# Patient Record
Sex: FEMALE | Race: WHITE | NOT HISPANIC OR LATINO | Employment: UNEMPLOYED | URBAN - METROPOLITAN AREA
[De-identification: names, ages, dates, MRNs, and addresses within clinical notes are randomized per-mention and may not be internally consistent; named-entity substitution may affect disease eponyms.]

---

## 2024-03-05 ENCOUNTER — TRANSCRIBE ORDERS (OUTPATIENT)
Dept: MATERNAL FETAL MEDICINE | Facility: HOSPITAL | Age: 24
End: 2024-03-05

## 2024-03-05 ENCOUNTER — MEDICAL CORRESPONDENCE (OUTPATIENT)
Dept: HEALTH INFORMATION MANAGEMENT | Facility: CLINIC | Age: 24
End: 2024-03-05
Payer: COMMERCIAL

## 2024-03-05 ENCOUNTER — TRANSFERRED RECORDS (OUTPATIENT)
Dept: HEALTH INFORMATION MANAGEMENT | Facility: CLINIC | Age: 24
End: 2024-03-05
Payer: COMMERCIAL

## 2024-03-05 DIAGNOSIS — O26.90 PREGNANCY RELATED CONDITION, ANTEPARTUM: Primary | ICD-10-CM

## 2024-03-10 ENCOUNTER — HEALTH MAINTENANCE LETTER (OUTPATIENT)
Age: 24
End: 2024-03-10

## 2024-03-19 ENCOUNTER — PRE VISIT (OUTPATIENT)
Dept: MATERNAL FETAL MEDICINE | Facility: HOSPITAL | Age: 24
End: 2024-03-19
Payer: COMMERCIAL

## 2024-03-21 ENCOUNTER — OFFICE VISIT (OUTPATIENT)
Dept: MATERNAL FETAL MEDICINE | Facility: HOSPITAL | Age: 24
End: 2024-03-21
Attending: STUDENT IN AN ORGANIZED HEALTH CARE EDUCATION/TRAINING PROGRAM
Payer: COMMERCIAL

## 2024-03-21 ENCOUNTER — ANCILLARY PROCEDURE (OUTPATIENT)
Dept: ULTRASOUND IMAGING | Facility: HOSPITAL | Age: 24
End: 2024-03-21
Attending: STUDENT IN AN ORGANIZED HEALTH CARE EDUCATION/TRAINING PROGRAM
Payer: COMMERCIAL

## 2024-03-21 DIAGNOSIS — O26.90 PREGNANCY RELATED CONDITION, ANTEPARTUM: ICD-10-CM

## 2024-03-21 DIAGNOSIS — Z36.89 ENCOUNTER FOR FETAL ANATOMIC SURVEY: Primary | ICD-10-CM

## 2024-03-21 DIAGNOSIS — O35.03X0 CHOROID PLEXUS CYST OF FETUS AFFECTING CARE OF MOTHER, ANTEPARTUM, SINGLE OR UNSPECIFIED FETUS: ICD-10-CM

## 2024-03-21 PROCEDURE — 76811 OB US DETAILED SNGL FETUS: CPT

## 2024-03-21 PROCEDURE — 99203 OFFICE O/P NEW LOW 30 MIN: CPT | Mod: 25 | Performed by: STUDENT IN AN ORGANIZED HEALTH CARE EDUCATION/TRAINING PROGRAM

## 2024-03-21 PROCEDURE — 76811 OB US DETAILED SNGL FETUS: CPT | Mod: 26 | Performed by: STUDENT IN AN ORGANIZED HEALTH CARE EDUCATION/TRAINING PROGRAM

## 2024-03-21 NOTE — PROGRESS NOTES
Please see the full imaging report from the [facility] program under the imaging tab.    Mis Hernandes MD  Maternal Fetal Medicine

## 2024-03-21 NOTE — NURSING NOTE
Natalya Cronin is a  at 18w5d who presents to [facility] for a comprehensive ultrasound. Pt reports positive fetal movement. Pt denies bldg/lof/change in discharge, contractions, headache, vision changes, chest pain/SOB or edema. SBAR given to Dr. AMMON Hernandes, see note in Epic.      Deborah Swift RN

## 2024-08-13 ENCOUNTER — HOSPITAL ENCOUNTER (OUTPATIENT)
Facility: CLINIC | Age: 24
Discharge: HOME | End: 2024-08-13
Attending: ADVANCED PRACTICE MIDWIFE | Admitting: REGISTERED NURSE
Payer: COMMERCIAL

## 2024-08-13 VITALS
OXYGEN SATURATION: 98 % | TEMPERATURE: 98.5 F | RESPIRATION RATE: 16 BRPM | SYSTOLIC BLOOD PRESSURE: 119 MMHG | DIASTOLIC BLOOD PRESSURE: 78 MMHG

## 2024-08-13 PROBLEM — Z36.89 ENCOUNTER FOR TRIAGE IN PREGNANT PATIENT: Status: ACTIVE | Noted: 2024-08-13

## 2024-08-13 RX ORDER — LIDOCAINE 40 MG/G
CREAM TOPICAL
Status: DISCONTINUED | OUTPATIENT
Start: 2024-08-13 | End: 2024-08-13 | Stop reason: HOSPADM

## 2024-08-13 RX ORDER — VITAMIN A, VITAMIN C, VITAMIN D-3, VITAMIN E, VITAMIN B-1, VITAMIN B-2, NIACIN, VITAMIN B-6, CALCIUM, IRON, ZINC, COPPER 4000; 120; 400; 22; 1.84; 3; 20; 10; 1; 12; 200; 27; 25; 2 [IU]/1; MG/1; [IU]/1; MG/1; MG/1; MG/1; MG/1; MG/1; MG/1; UG/1; MG/1; MG/1; MG/1; MG/1
TABLET ORAL DAILY
COMMUNITY

## 2024-08-13 ASSESSMENT — ACTIVITIES OF DAILY LIVING (ADL): ADLS_ACUITY_SCORE: 35

## 2024-08-13 NOTE — PROGRESS NOTES
Data: Patient presented to [facility]: 2024  2:46 PM.  Reason for maternal/fetal assessment is decreased fetal movement. Patient reports she has felt the baby move but it has not been what she is used to. She also has complained of a headache and increased swelling in her hands and face. Patient has not taken any medication for the headaches. Patient also reports some spotting on Saturday. Patient denies uterine contractions, leaking of vaginal fluid/rupture of membranes, vaginal bleeding, abdominal pain, pelvic pressure, nausea, vomiting, visual disturbances, epigastric or RUQ pain, significant edema. Patient reports fetal movement is  decreased per patient report . Patient is a 39w3d .  Prenatal record reviewed. Pregnancy has been uncomplicated.    Vital signs wnl. Support person is present. William, her  is with her.    Action: Verbal consent for EFM. Triage assessment completed.     Response: Patient verbalized agreement with plan. Will contact Chacha Singletary CNM with update and further orders.

## 2024-08-13 NOTE — PROGRESS NOTES
Data: Patient assessed in the [facility] for decreased fetal movement. Cervical exam deferred. Membranes intact. Contractions are present. Contactions are occasional and inconsistent, patient does not feel contraction pain. Uterine assessment is mild by palpation during contractions and soft by palpation at rest. See flowsheets for fetal assessment documentation.     Action: Presumed adequate fetal oxygenation documented. Discharge instructions reviewed. Patient instructed to report change in fetal movement, vaginal leaking of fluid or bleeding, abdominal pain, or any concerns related to the pregnancy to provider/clinic.      Response: Orders to discharge home per Chacha Singletary. LUZ. Patient verbalized understanding of education and agreement with plan. Discharged to home at 1545.

## 2024-08-13 NOTE — DISCHARGE INSTRUCTIONS
Learning About When to Call Your Doctor During Pregnancy (After 20 Weeks)  Overview  It's common to have concerns about what might be a problem when you're pregnant. Most pregnancies don't have any serious problems. But it's still important to know when to call your doctor if you have certain symptoms or signs of labor.  These are general suggestions. Your doctor may give you some more information about when to call.  When to call your doctor (after 20 weeks)  Call 911  anytime you think you may need emergency care. For example, call if:  You have severe vaginal bleeding. This means you are soaking through a pad each hour for 2 or more hours.  You have sudden, severe pain in your belly.  You have chest pain, are short of breath, or cough up blood.  You passed out (lost consciousness).  You have a seizure.  You see or feel the umbilical cord.  You think you are about to deliver your baby and can't make it safely to the hospital or birthing center.  Call your doctor now or seek immediate medical care if:  You have vaginal bleeding.  You have belly pain.  You have a fever.  You are dizzy or lightheaded, or you feel like you may faint.  You have signs of a blood clot in your leg (called a deep vein thrombosis), such as:  Pain in the calf, back of the knee, thigh, or groin.  Swelling in your leg or groin.  A color change on the leg or groin. The skin may be reddish or purplish, depending on your usual skin color.  You have symptoms of preeclampsia, such as:  Sudden swelling of your face, hands, or feet.  New vision problems (such as dimness, blurring, or seeing spots).  A severe headache.  You have a sudden release of fluid from your vagina. (You think your water broke.)  You've been having regular contractions for an hour. This means that you've had at least 6 contractions within 1 hour, even after you change your position and drink fluids.  You notice that your baby has stopped moving or is moving less than  "normal.  You have signs of heart failure, such as:  New or increased shortness of breath.  New or worse swelling in your legs, ankles, or feet.  Sudden weight gain, such as more than 2 to 3 pounds in a day or 5 pounds in a week.  Feeling so tired or weak that you cannot do your usual activities.  You have symptoms of a urinary tract infection. These may include:  Pain or burning when you urinate.  A frequent need to urinate without being able to pass much urine.  Pain in the flank, which is just below the rib cage and above the waist on either side of the back.  Blood in your urine.  Watch closely for changes in your health, and be sure to contact your doctor if:  You have vaginal discharge that smells bad.  You feel sad, anxious, or hopeless for more than a few days.  You have skin changes, such as a rash, itching, or a yellow color to your skin.  You have other concerns about your pregnancy.  If you have labor signs at 37 weeks or more  If you have signs of labor at 37 weeks or more, your doctor may tell you to call when your labor becomes more active. Symptoms of active labor include:  Contractions that are regular.  Contractions that are less than 5 minutes apart.  Contractions that are hard to talk through.  Follow-up care is a key part of your treatment and safety. Be sure to make and go to all appointments, and call your doctor if you are having problems. It's also a good idea to know your test results and keep a list of the medicines you take.  Where can you learn more?  Go to https://www.[employer].net/patiented  Enter N531 in the search box to learn more about \"Learning About When to Call Your Doctor During Pregnancy (After 20 Weeks).\"  Current as of: July 10, 2023  Content Version: 14.1    4845-9116 [email or web address].   Care instructions adapted under license by your healthcare professional. If you have questions about a medical condition or this instruction, always ask your healthcare " professional. [employer] disclaims any warranty or liability for your use of this information.

## 2024-08-14 NOTE — PROGRESS NOTES
"Outpatient/Triage Note:    Patient Name:  Natalya Cronin  :      2000  MRN:      [id number]      Assessment:   @ 39w3d here for evaluation of decreased fetal movement.     Plan:   -NST reactive. Discussed likely baby descending in pelvis given light spotting, irregular contractions and \"crotch lightening\".   - Discharge to home undelivered. Reviewed warning signs including decreased fetal movement, leaking of fluid, vaginal bleeding, or signs of labor. Reviewed how to contact on-call provider. Follow-up in clinic with OB provider as scheduled or sooner as needed. All questions answered. Agrees with plan.     Subjective:  Natalya Cronin is a 24 year old  at 39 weeks, who presented to [facility] for evaluation of DFM. Denies leaking of fluid, bleeding, or changes in fetal movement.     Objective:  Vital signs: /78   Temp 98.5  F (36.9  C) (Oral)   Resp 16   LMP 2023   SpO2 98%   FHR: NST reactive  Uterine contractions: every 5-7 minutes, mild    Physical Exam: A&Ox3  Abdomen: SIUP, abdomen non-tender  SVE: deferred  Legs: no edema, moves freely      Results:  No results found for this or any previous visit (from the past 168 hour(s)).      Provider: Chacha Singletary CNM  "

## 2024-08-15 ENCOUNTER — TELEPHONE (OUTPATIENT)
Dept: OBGYN | Facility: CLINIC | Age: 24
End: 2024-08-15
Payer: COMMERCIAL

## 2024-08-15 NOTE — TELEPHONE ENCOUNTER
Natalya called unit stating she has been having contractions throughout the night and is wondering if it's time to come to the hospital yet. She states she had a membrane sweep yesterday morning at her appt and she lost her mucous plug yesterday evening. Patient states her contractions were consistently 5:1:1 for an hour last night and then she stopped timing them. She was able to sleep with some contractions waking her up during the night, contractions continuing this morning. She is still able to walk and talk through contractions. States she is 39+5, . Patient is going to call clinic number to talk to midwife on call to come up with a plan for when to be evaluated.  RADHA YARBROUGH RN on 8/15/2024 at 6:55 AM

## 2024-08-16 ENCOUNTER — HOSPITAL ENCOUNTER (INPATIENT)
Facility: CLINIC | Age: 24
LOS: 2 days | Discharge: HOME | End: 2024-08-18
Payer: COMMERCIAL

## 2024-08-16 ENCOUNTER — ANESTHESIA (OUTPATIENT)
Dept: OBGYN | Facility: CLINIC | Age: 24
End: 2024-08-16
Payer: COMMERCIAL

## 2024-08-16 ENCOUNTER — ANESTHESIA EVENT (OUTPATIENT)
Dept: OBGYN | Facility: CLINIC | Age: 24
End: 2024-08-16
Payer: COMMERCIAL

## 2024-08-16 ENCOUNTER — HOSPITAL ENCOUNTER (OUTPATIENT)
Facility: CLINIC | Age: 24
Discharge: HOME | End: 2024-08-16
Payer: COMMERCIAL

## 2024-08-16 VITALS
TEMPERATURE: 98.4 F | BODY MASS INDEX: 27.49 KG/M2 | WEIGHT: 161 LBS | DIASTOLIC BLOOD PRESSURE: 82 MMHG | OXYGEN SATURATION: 98 % | HEIGHT: 64 IN | RESPIRATION RATE: 16 BRPM | SYSTOLIC BLOOD PRESSURE: 129 MMHG

## 2024-08-16 PROBLEM — O47.9 UTERINE CONTRACTIONS: Status: ACTIVE | Noted: 2024-08-16

## 2024-08-16 LAB
ABO (EXTERNAL): NORMAL
ABO/RH(D): ABNORMAL
ANTIBODY ID: NORMAL
ANTIBODY SCREEN: POSITIVE
GROUP B STREPTOCOCCUS (EXTERNAL): POSITIVE
HEPATITIS B SURFACE ANTIGEN (EXTERNAL): NONREACTIVE
HGB BLD-MCNC: 12.8 G/DL (ref 11.7–15.7)
HIV1+2 AB SERPL QL IA: NONREACTIVE
RH (EXTERNAL): NEGATIVE
RUBELLA ANTIBODY IGG (EXTERNAL): NORMAL
SPECIMEN EXPIRATION DATE: ABNORMAL
SPECIMEN EXPIRATION DATE: NORMAL
T PALLIDUM AB SER QL: NONREACTIVE
TREPONEMA PALLIDUM ANTIBODY (EXTERNAL): NONREACTIVE

## 2024-08-16 PROCEDURE — 370N000003 HC ANESTHESIA WARD SERVICE: Performed by: ANESTHESIOLOGY

## 2024-08-16 PROCEDURE — 86780 TREPONEMA PALLIDUM: CPT

## 2024-08-16 PROCEDURE — 86870 RBC ANTIBODY IDENTIFICATION: CPT

## 2024-08-16 PROCEDURE — 36415 COLL VENOUS BLD VENIPUNCTURE: CPT

## 2024-08-16 PROCEDURE — 258N000003 HC RX IP 258 OP 636

## 2024-08-16 PROCEDURE — 3E0R3BZ INTRODUCTION OF ANESTHETIC AGENT INTO SPINAL CANAL, PERCUTANEOUS APPROACH: ICD-10-PCS | Performed by: ANESTHESIOLOGY

## 2024-08-16 PROCEDURE — 0UQMXZZ REPAIR VULVA, EXTERNAL APPROACH: ICD-10-PCS | Performed by: ADVANCED PRACTICE MIDWIFE

## 2024-08-16 PROCEDURE — 86900 BLOOD TYPING SEROLOGIC ABO: CPT

## 2024-08-16 PROCEDURE — 722N000001 HC LABOR CARE VAGINAL DELIVERY SINGLE

## 2024-08-16 PROCEDURE — 250N000011 HC RX IP 250 OP 636

## 2024-08-16 PROCEDURE — G0463 HOSPITAL OUTPT CLINIC VISIT: HCPCS

## 2024-08-16 PROCEDURE — 00HU33Z INSERTION OF INFUSION DEVICE INTO SPINAL CANAL, PERCUTANEOUS APPROACH: ICD-10-PCS | Performed by: ANESTHESIOLOGY

## 2024-08-16 PROCEDURE — 250N000009 HC RX 250

## 2024-08-16 PROCEDURE — 250N000011 HC RX IP 250 OP 636: Performed by: ANESTHESIOLOGY

## 2024-08-16 PROCEDURE — 120N000001 HC R&B MED SURG/OB

## 2024-08-16 PROCEDURE — 250N000009 HC RX 250: Performed by: ANESTHESIOLOGY

## 2024-08-16 PROCEDURE — 85018 HEMOGLOBIN: CPT

## 2024-08-16 RX ORDER — LIDOCAINE 40 MG/G
CREAM TOPICAL
Status: DISCONTINUED | OUTPATIENT
Start: 2024-08-16 | End: 2024-08-16 | Stop reason: HOSPADM

## 2024-08-16 RX ORDER — ACETAMINOPHEN 325 MG/1
650 TABLET ORAL EVERY 4 HOURS PRN
Status: DISCONTINUED | OUTPATIENT
Start: 2024-08-16 | End: 2024-08-18 | Stop reason: HOSPADM

## 2024-08-16 RX ORDER — OXYTOCIN 10 [USP'U]/ML
10 INJECTION, SOLUTION INTRAMUSCULAR; INTRAVENOUS
Status: DISCONTINUED | OUTPATIENT
Start: 2024-08-16 | End: 2024-08-18 | Stop reason: HOSPADM

## 2024-08-16 RX ORDER — NALBUPHINE HYDROCHLORIDE 10 MG/ML
2.5-5 INJECTION, SOLUTION INTRAMUSCULAR; INTRAVENOUS; SUBCUTANEOUS EVERY 6 HOURS PRN
Status: DISCONTINUED | OUTPATIENT
Start: 2024-08-16 | End: 2024-08-18 | Stop reason: HOSPADM

## 2024-08-16 RX ORDER — NALOXONE HYDROCHLORIDE 0.4 MG/ML
0.2 INJECTION, SOLUTION INTRAMUSCULAR; INTRAVENOUS; SUBCUTANEOUS
Status: DISCONTINUED | OUTPATIENT
Start: 2024-08-16 | End: 2024-08-16 | Stop reason: HOSPADM

## 2024-08-16 RX ORDER — OXYTOCIN 10 [USP'U]/ML
10 INJECTION, SOLUTION INTRAMUSCULAR; INTRAVENOUS
Status: DISCONTINUED | OUTPATIENT
Start: 2024-08-16 | End: 2024-08-16 | Stop reason: HOSPADM

## 2024-08-16 RX ORDER — OXYTOCIN/0.9 % SODIUM CHLORIDE 30/500 ML
340 PLASTIC BAG, INJECTION (ML) INTRAVENOUS CONTINUOUS PRN
Status: DISCONTINUED | OUTPATIENT
Start: 2024-08-16 | End: 2024-08-18 | Stop reason: HOSPADM

## 2024-08-16 RX ORDER — KETOROLAC TROMETHAMINE 30 MG/ML
30 INJECTION, SOLUTION INTRAMUSCULAR; INTRAVENOUS
Status: COMPLETED | OUTPATIENT
Start: 2024-08-16 | End: 2024-08-17

## 2024-08-16 RX ORDER — BISACODYL 10 MG
10 SUPPOSITORY, RECTAL RECTAL DAILY PRN
Status: DISCONTINUED | OUTPATIENT
Start: 2024-08-16 | End: 2024-08-18 | Stop reason: HOSPADM

## 2024-08-16 RX ORDER — LOPERAMIDE HCL 2 MG
4 CAPSULE ORAL
Status: DISCONTINUED | OUTPATIENT
Start: 2024-08-16 | End: 2024-08-18 | Stop reason: HOSPADM

## 2024-08-16 RX ORDER — TRANEXAMIC ACID 10 MG/ML
1 INJECTION, SOLUTION INTRAVENOUS EVERY 30 MIN PRN
Status: DISCONTINUED | OUTPATIENT
Start: 2024-08-16 | End: 2024-08-16 | Stop reason: HOSPADM

## 2024-08-16 RX ORDER — OXYCODONE HYDROCHLORIDE 5 MG/1
5 TABLET ORAL EVERY 4 HOURS PRN
Status: DISCONTINUED | OUTPATIENT
Start: 2024-08-16 | End: 2024-08-18 | Stop reason: HOSPADM

## 2024-08-16 RX ORDER — FENTANYL CITRATE-0.9 % NACL/PF 10 MCG/ML
100 PLASTIC BAG, INJECTION (ML) INTRAVENOUS EVERY 5 MIN PRN
Status: DISCONTINUED | OUTPATIENT
Start: 2024-08-16 | End: 2024-08-16 | Stop reason: HOSPADM

## 2024-08-16 RX ORDER — NALOXONE HYDROCHLORIDE 0.4 MG/ML
0.4 INJECTION, SOLUTION INTRAMUSCULAR; INTRAVENOUS; SUBCUTANEOUS
Status: DISCONTINUED | OUTPATIENT
Start: 2024-08-16 | End: 2024-08-18 | Stop reason: HOSPADM

## 2024-08-16 RX ORDER — NALOXONE HYDROCHLORIDE 0.4 MG/ML
0.2 INJECTION, SOLUTION INTRAMUSCULAR; INTRAVENOUS; SUBCUTANEOUS
Status: DISCONTINUED | OUTPATIENT
Start: 2024-08-16 | End: 2024-08-18 | Stop reason: HOSPADM

## 2024-08-16 RX ORDER — ONDANSETRON 2 MG/ML
4 INJECTION INTRAMUSCULAR; INTRAVENOUS EVERY 6 HOURS PRN
Status: DISCONTINUED | OUTPATIENT
Start: 2024-08-16 | End: 2024-08-16 | Stop reason: HOSPADM

## 2024-08-16 RX ORDER — CITRIC ACID/SODIUM CITRATE 334-500MG
30 SOLUTION, ORAL ORAL
Status: DISCONTINUED | OUTPATIENT
Start: 2024-08-16 | End: 2024-08-16 | Stop reason: HOSPADM

## 2024-08-16 RX ORDER — ONDANSETRON 4 MG/1
4 TABLET, ORALLY DISINTEGRATING ORAL EVERY 6 HOURS PRN
Status: DISCONTINUED | OUTPATIENT
Start: 2024-08-16 | End: 2024-08-16 | Stop reason: HOSPADM

## 2024-08-16 RX ORDER — ACETAMINOPHEN 325 MG/1
650 TABLET ORAL EVERY 4 HOURS PRN
Status: DISCONTINUED | OUTPATIENT
Start: 2024-08-16 | End: 2024-08-16 | Stop reason: HOSPADM

## 2024-08-16 RX ORDER — NALOXONE HYDROCHLORIDE 0.4 MG/ML
0.4 INJECTION, SOLUTION INTRAMUSCULAR; INTRAVENOUS; SUBCUTANEOUS
Status: DISCONTINUED | OUTPATIENT
Start: 2024-08-16 | End: 2024-08-16 | Stop reason: HOSPADM

## 2024-08-16 RX ORDER — HYDROCORTISONE 25 MG/G
CREAM TOPICAL 3 TIMES DAILY PRN
Status: DISCONTINUED | OUTPATIENT
Start: 2024-08-16 | End: 2024-08-18 | Stop reason: HOSPADM

## 2024-08-16 RX ORDER — OXYTOCIN/0.9 % SODIUM CHLORIDE 30/500 ML
100-340 PLASTIC BAG, INJECTION (ML) INTRAVENOUS CONTINUOUS PRN
Status: DISCONTINUED | OUTPATIENT
Start: 2024-08-16 | End: 2024-08-18 | Stop reason: HOSPADM

## 2024-08-16 RX ORDER — MISOPROSTOL 200 UG/1
400 TABLET ORAL
Status: DISCONTINUED | OUTPATIENT
Start: 2024-08-16 | End: 2024-08-16 | Stop reason: HOSPADM

## 2024-08-16 RX ORDER — SODIUM CHLORIDE, SODIUM LACTATE, POTASSIUM CHLORIDE, CALCIUM CHLORIDE 600; 310; 30; 20 MG/100ML; MG/100ML; MG/100ML; MG/100ML
INJECTION, SOLUTION INTRAVENOUS CONTINUOUS
Status: DISCONTINUED | OUTPATIENT
Start: 2024-08-16 | End: 2024-08-18 | Stop reason: HOSPADM

## 2024-08-16 RX ORDER — IBUPROFEN 800 MG/1
800 TABLET, FILM COATED ORAL
Status: COMPLETED | OUTPATIENT
Start: 2024-08-16 | End: 2024-08-17

## 2024-08-16 RX ORDER — MISOPROSTOL 200 UG/1
400 TABLET ORAL
Status: DISCONTINUED | OUTPATIENT
Start: 2024-08-16 | End: 2024-08-18 | Stop reason: HOSPADM

## 2024-08-16 RX ORDER — CARBOPROST TROMETHAMINE 250 UG/ML
250 INJECTION, SOLUTION INTRAMUSCULAR
Status: DISCONTINUED | OUTPATIENT
Start: 2024-08-16 | End: 2024-08-16 | Stop reason: HOSPADM

## 2024-08-16 RX ORDER — OXYTOCIN/0.9 % SODIUM CHLORIDE 30/500 ML
340 PLASTIC BAG, INJECTION (ML) INTRAVENOUS CONTINUOUS PRN
Status: DISCONTINUED | OUTPATIENT
Start: 2024-08-16 | End: 2024-08-16 | Stop reason: HOSPADM

## 2024-08-16 RX ORDER — LIDOCAINE HYDROCHLORIDE AND EPINEPHRINE 15; 5 MG/ML; UG/ML
INJECTION, SOLUTION EPIDURAL PRN
Status: DISCONTINUED | OUTPATIENT
Start: 2024-08-16 | End: 2024-08-16

## 2024-08-16 RX ORDER — LOPERAMIDE HCL 2 MG
2 CAPSULE ORAL
Status: DISCONTINUED | OUTPATIENT
Start: 2024-08-16 | End: 2024-08-18 | Stop reason: HOSPADM

## 2024-08-16 RX ORDER — CARBOPROST TROMETHAMINE 250 UG/ML
250 INJECTION, SOLUTION INTRAMUSCULAR
Status: DISCONTINUED | OUTPATIENT
Start: 2024-08-16 | End: 2024-08-18 | Stop reason: HOSPADM

## 2024-08-16 RX ORDER — METHYLERGONOVINE MALEATE 0.2 MG/ML
200 INJECTION INTRAVENOUS
Status: DISCONTINUED | OUTPATIENT
Start: 2024-08-16 | End: 2024-08-16 | Stop reason: HOSPADM

## 2024-08-16 RX ORDER — DOCUSATE SODIUM 100 MG/1
100 CAPSULE, LIQUID FILLED ORAL DAILY
Status: DISCONTINUED | OUTPATIENT
Start: 2024-08-17 | End: 2024-08-18 | Stop reason: HOSPADM

## 2024-08-16 RX ORDER — FENTANYL/ROPIVACAINE/NS/PF 2MCG/ML-.1
PLASTIC BAG, INJECTION (ML) EPIDURAL
Status: DISCONTINUED | OUTPATIENT
Start: 2024-08-16 | End: 2024-08-16 | Stop reason: HOSPADM

## 2024-08-16 RX ORDER — METOCLOPRAMIDE HYDROCHLORIDE 5 MG/ML
10 INJECTION INTRAMUSCULAR; INTRAVENOUS EVERY 6 HOURS PRN
Status: DISCONTINUED | OUTPATIENT
Start: 2024-08-16 | End: 2024-08-16 | Stop reason: HOSPADM

## 2024-08-16 RX ORDER — MISOPROSTOL 200 UG/1
800 TABLET ORAL
Status: DISCONTINUED | OUTPATIENT
Start: 2024-08-16 | End: 2024-08-18 | Stop reason: HOSPADM

## 2024-08-16 RX ORDER — IBUPROFEN 800 MG/1
800 TABLET, FILM COATED ORAL EVERY 6 HOURS PRN
Status: DISCONTINUED | OUTPATIENT
Start: 2024-08-16 | End: 2024-08-18 | Stop reason: HOSPADM

## 2024-08-16 RX ORDER — TRANEXAMIC ACID 10 MG/ML
1 INJECTION, SOLUTION INTRAVENOUS EVERY 30 MIN PRN
Status: DISCONTINUED | OUTPATIENT
Start: 2024-08-16 | End: 2024-08-18 | Stop reason: HOSPADM

## 2024-08-16 RX ORDER — MISOPROSTOL 200 UG/1
800 TABLET ORAL
Status: DISCONTINUED | OUTPATIENT
Start: 2024-08-16 | End: 2024-08-16 | Stop reason: HOSPADM

## 2024-08-16 RX ORDER — PENICILLIN G 3000000 [IU]/50ML
3 INJECTION, SOLUTION INTRAVENOUS EVERY 4 HOURS
Status: DISCONTINUED | OUTPATIENT
Start: 2024-08-16 | End: 2024-08-16 | Stop reason: HOSPADM

## 2024-08-16 RX ORDER — METOCLOPRAMIDE 10 MG/1
10 TABLET ORAL EVERY 6 HOURS PRN
Status: DISCONTINUED | OUTPATIENT
Start: 2024-08-16 | End: 2024-08-16 | Stop reason: HOSPADM

## 2024-08-16 RX ORDER — LOPERAMIDE HCL 2 MG
2 CAPSULE ORAL
Status: DISCONTINUED | OUTPATIENT
Start: 2024-08-16 | End: 2024-08-16 | Stop reason: HOSPADM

## 2024-08-16 RX ORDER — MODIFIED LANOLIN
OINTMENT (GRAM) TOPICAL
Status: DISCONTINUED | OUTPATIENT
Start: 2024-08-16 | End: 2024-08-18 | Stop reason: HOSPADM

## 2024-08-16 RX ORDER — BUPIVACAINE HYDROCHLORIDE 2.5 MG/ML
INJECTION, SOLUTION EPIDURAL; INFILTRATION; INTRACAUDAL
Status: COMPLETED | OUTPATIENT
Start: 2024-08-16 | End: 2024-08-16

## 2024-08-16 RX ORDER — FENTANYL CITRATE 50 UG/ML
100 INJECTION, SOLUTION INTRAMUSCULAR; INTRAVENOUS
Status: DISCONTINUED | OUTPATIENT
Start: 2024-08-16 | End: 2024-08-16 | Stop reason: HOSPADM

## 2024-08-16 RX ORDER — METHYLERGONOVINE MALEATE 0.2 MG/ML
200 INJECTION INTRAVENOUS
Status: DISCONTINUED | OUTPATIENT
Start: 2024-08-16 | End: 2024-08-18 | Stop reason: HOSPADM

## 2024-08-16 RX ORDER — PROCHLORPERAZINE 25 MG
25 SUPPOSITORY, RECTAL RECTAL EVERY 12 HOURS PRN
Status: DISCONTINUED | OUTPATIENT
Start: 2024-08-16 | End: 2024-08-16 | Stop reason: HOSPADM

## 2024-08-16 RX ORDER — PROCHLORPERAZINE MALEATE 10 MG
10 TABLET ORAL EVERY 6 HOURS PRN
Status: DISCONTINUED | OUTPATIENT
Start: 2024-08-16 | End: 2024-08-16 | Stop reason: HOSPADM

## 2024-08-16 RX ORDER — PENICILLIN G POTASSIUM 5000000 [IU]/1
5 INJECTION, POWDER, FOR SOLUTION INTRAMUSCULAR; INTRAVENOUS ONCE
Status: COMPLETED | OUTPATIENT
Start: 2024-08-16 | End: 2024-08-16

## 2024-08-16 RX ORDER — LOPERAMIDE HCL 2 MG
4 CAPSULE ORAL
Status: DISCONTINUED | OUTPATIENT
Start: 2024-08-16 | End: 2024-08-16 | Stop reason: HOSPADM

## 2024-08-16 RX ADMIN — LIDOCAINE HYDROCHLORIDE,EPINEPHRINE BITARTRATE 2 ML: 15; .005 INJECTION, SOLUTION EPIDURAL; INFILTRATION; INTRACAUDAL; PERINEURAL at 18:32

## 2024-08-16 RX ADMIN — Medication 340 ML/HR: at 23:19

## 2024-08-16 RX ADMIN — SODIUM CHLORIDE, POTASSIUM CHLORIDE, SODIUM LACTATE AND CALCIUM CHLORIDE: 600; 310; 30; 20 INJECTION, SOLUTION INTRAVENOUS at 15:43

## 2024-08-16 RX ADMIN — SODIUM CHLORIDE, POTASSIUM CHLORIDE, SODIUM LACTATE AND CALCIUM CHLORIDE: 600; 310; 30; 20 INJECTION, SOLUTION INTRAVENOUS at 18:24

## 2024-08-16 RX ADMIN — Medication: at 18:35

## 2024-08-16 RX ADMIN — BUPIVACAINE HYDROCHLORIDE 5 ML: 2.5 INJECTION, SOLUTION EPIDURAL; INFILTRATION; INTRACAUDAL at 18:35

## 2024-08-16 RX ADMIN — PENICILLIN G 3 MILLION UNITS: 3000000 INJECTION, SOLUTION INTRAVENOUS at 19:49

## 2024-08-16 RX ADMIN — LIDOCAINE HYDROCHLORIDE,EPINEPHRINE BITARTRATE 3 ML: 15; .005 INJECTION, SOLUTION EPIDURAL; INFILTRATION; INTRACAUDAL; PERINEURAL at 18:29

## 2024-08-16 RX ADMIN — PENICILLIN G POTASSIUM 5 MILLION UNITS: 5000000 POWDER, FOR SOLUTION INTRAMUSCULAR; INTRAPLEURAL; INTRATHECAL; INTRAVENOUS at 15:43

## 2024-08-16 RX ADMIN — ONDANSETRON 4 MG: 2 INJECTION INTRAMUSCULAR; INTRAVENOUS at 18:09

## 2024-08-16 ASSESSMENT — ACTIVITIES OF DAILY LIVING (ADL)
ADLS_ACUITY_SCORE: 18
ADLS_ACUITY_SCORE: 35
ADLS_ACUITY_SCORE: 18

## 2024-08-16 NOTE — PROGRESS NOTES
Natalya Cronin, , 39w6d, arrived to triage, accompanied by spouse William. Patient here with c/o contractions/bleeding. Patient under care of [facility], did consult provider prior to arriving to triage.     Patient reports contractions. Patient rates pain at 6/10. Patient denies SROM. Patient reports vaginal bleeding. Patient denies s/s of preeclampsia. Fetal movement Present. Patient denies issues this pregnancy.    Patient oriented to room, call light in reach. EFM and toco applied. Triage orders placed.  Provider updated, Didi Phillips CNM.    Viki Youssef RN

## 2024-08-16 NOTE — ANESTHESIA PREPROCEDURE EVALUATION
"Anesthesia Pre-Procedure Evaluation    Patient: Natalya Cronin   MRN: [id number] : 2000        Procedure : * No procedures listed *          No past medical history on file.   Past Surgical History:   Procedure Laterality Date     wisdom teeth  2019      No Known Allergies   Social History     Tobacco Use     Smoking status: Never     Smokeless tobacco: Never   Substance Use Topics     Alcohol use: Not Currently      Wt Readings from Last 1 Encounters:   24 73 kg (161 lb)        Anesthesia Evaluation            ROS/MED HX  ENT/Pulmonary:       Neurologic:  - neg neurologic ROS     Cardiovascular:  - neg cardiovascular ROS     METS/Exercise Tolerance:     Hematologic:  - neg hematologic  ROS     Musculoskeletal:  - neg musculoskeletal ROS     GI/Hepatic:  - neg GI/hepatic ROS     Renal/Genitourinary:  - neg Renal ROS     Endo:  - neg endo ROS     Psychiatric/Substance Use:  - neg psychiatric ROS     Infectious Disease:  - neg infectious disease ROS     Malignancy:       Other:      (+) Possibly pregnant, , ,       Physical Exam    Airway        Mallampati: II   TM distance: > 3 FB   Neck ROM: full   Mouth opening: > 3 cm    Respiratory Devices and Support         Dental       (+) Minor Abnormalities - some fillings, tiny chips      Cardiovascular   cardiovascular exam normal       Rhythm and rate: regular and normal     Pulmonary   pulmonary exam normal        breath sounds clear to auscultation       OUTSIDE LABS:  CBC:   Lab Results   Component Value Date    HGB 12.8 2024     BMP: No results found for: \"NA\", \"POTASSIUM\", \"CHLORIDE\", \"CO2\", \"BUN\", \"CR\", \"GLC\"  COAGS: No results found for: \"PTT\", \"INR\", \"FIBR\"  POC: No results found for: \"BGM\", \"HCG\", \"HCGS\"  HEPATIC: No results found for: \"ALBUMIN\", \"PROTTOTAL\", \"ALT\", \"AST\", \"GGT\", \"ALKPHOS\", \"BILITOTAL\", \"BILIDIRECT\", \"MATTHEW\"  OTHER: No results found for: \"PH\", \"LACT\", \"A1C\", \"LANDEN\", \"PHOS\", \"MAG\", \"LIPASE\", \"AMYLASE\", \"TSH\", \"T4\", \"T3\", " "\"CRP\", \"SED\"    Anesthesia Plan    ASA Status:  2       Anesthesia Type: Epidural.              Consents    Anesthesia Plan(s) and associated risks, benefits, and realistic alternatives discussed. Questions answered and patient/representative(s) expressed understanding.     - Discussed: Risks, Benefits and Alternatives for the PROCEDURE were discussed     - Discussed with:  Patient, Spouse            Postoperative Care            Comments:             Devin Patiño MD    I have reviewed the pertinent notes and labs in the chart from the past 30 days and (re)examined the patient.  Any updates or changes from those notes are reflected in this note.                  "

## 2024-08-16 NOTE — ANESTHESIA PROCEDURE NOTES
"Epidural catheter Procedure Note    Pre-Procedure   Staff -        Anesthesiologist:  Devin Patiño MD       Performed By: anesthesiologist       Location: OB       Procedure Start/Stop Times: 8/16/2024 6:22 PM and 8/16/2024 6:39 PM       Pre-Anesthestic Checklist: patient identified, risks and benefits discussed, informed consent, monitors and equipment checked and pre-op evaluation  Timeout:       Correct Patient: Yes        Correct Procedure: Yes        Correct Site: Yes        Correct Position: Yes   Procedure Documentation  Procedure: epidural catheter       Diagnosis: labor pain       Patient Position: sitting       Patient Prep/Sterile Barriers: sterile gloves, mask, patient draped       Skin prep: Chloraprep       Local skin infiltrated with 2 mL of 1% lidocaine.        Insertion Site: L3-4. (midline approach).       Technique: LORT saline        Needle Type: [employer]       Needle Gauge: 18.        Catheter: 20 G.          Catheter threaded easily.         6 cm epidural space.           # of attempts: 1 and  # of redirects:  0    Assessment/Narrative         Paresthesias: No.       Test dose of 3 mL lidocaine 1.5% w/ 1:200,000 epinephrine at.        .       Insertion/Infusion Method: LORT saline       Aspiration negative for Heme or CSF via Epidural Catheter.    Medication(s) Administered   0.25% Bupivacaine PF (Epidural) - EPIDURAL   5 mL - 8/16/2024 6:35:00 PM  Medication Administration Time: 8/16/2024 6:22 PM      FOR [facility] ([facility] ONLY:   Pain Team Contact information: please page the Pain Team Via [employer]. Search \"Pain\". During daytime hours, please page the attending first. At night please page the resident first.      "

## 2024-08-17 LAB
ABO/RH(D): NORMAL
FETAL BLEED SCREEN: NORMAL
HGB BLD-MCNC: 11 G/DL (ref 11.7–15.7)
SPECIMEN EXPIRATION DATE: NORMAL

## 2024-08-17 PROCEDURE — 250N000013 HC RX MED GY IP 250 OP 250 PS 637: Performed by: ADVANCED PRACTICE MIDWIFE

## 2024-08-17 PROCEDURE — 250N000011 HC RX IP 250 OP 636: Performed by: ADVANCED PRACTICE MIDWIFE

## 2024-08-17 PROCEDURE — 36415 COLL VENOUS BLD VENIPUNCTURE: CPT | Performed by: ADVANCED PRACTICE MIDWIFE

## 2024-08-17 PROCEDURE — 250N000011 HC RX IP 250 OP 636

## 2024-08-17 PROCEDURE — 36415 COLL VENOUS BLD VENIPUNCTURE: CPT

## 2024-08-17 PROCEDURE — 85018 HEMOGLOBIN: CPT | Performed by: ADVANCED PRACTICE MIDWIFE

## 2024-08-17 PROCEDURE — 120N000001 HC R&B MED SURG/OB

## 2024-08-17 PROCEDURE — 85461 HEMOGLOBIN FETAL: CPT

## 2024-08-17 RX ADMIN — IBUPROFEN 800 MG: 800 TABLET, FILM COATED ORAL at 12:38

## 2024-08-17 RX ADMIN — KETOROLAC TROMETHAMINE 30 MG: 30 INJECTION, SOLUTION INTRAMUSCULAR at 00:37

## 2024-08-17 RX ADMIN — IBUPROFEN 800 MG: 800 TABLET, FILM COATED ORAL at 06:33

## 2024-08-17 RX ADMIN — Medication: at 00:37

## 2024-08-17 RX ADMIN — ACETAMINOPHEN 650 MG: 325 TABLET ORAL at 23:53

## 2024-08-17 RX ADMIN — HUMAN RHO(D) IMMUNE GLOBULIN 300 MCG: 1500 SOLUTION INTRAMUSCULAR; INTRAVENOUS at 03:14

## 2024-08-17 RX ADMIN — IBUPROFEN 800 MG: 800 TABLET, FILM COATED ORAL at 21:23

## 2024-08-17 RX ADMIN — ACETAMINOPHEN 650 MG: 325 TABLET ORAL at 17:11

## 2024-08-17 RX ADMIN — DOCUSATE SODIUM 100 MG: 100 CAPSULE, LIQUID FILLED ORAL at 08:25

## 2024-08-17 RX ADMIN — BENZOCAINE AND LEVOMENTHOL: 200; 5 SPRAY TOPICAL at 00:37

## 2024-08-17 RX ADMIN — ACETAMINOPHEN 650 MG: 325 TABLET ORAL at 04:20

## 2024-08-17 ASSESSMENT — ACTIVITIES OF DAILY LIVING (ADL)
ADLS_ACUITY_SCORE: 18

## 2024-08-17 NOTE — H&P
HISTORY AND PHYSICAL UPDATE ADMISSION EXAM    Name: Natalya Cronin  YOB: 2000  Medical Record Number: [id number]    History of Present Illness: Natalya Cronin is a 24 year old female who is 39w6d pregnant and being admitted for labor management. She was 4.5 cm dilated on admission. Natalya received early and consistent prenatal care.     Last growth US 24 37 wks: EFW 23%  A negative  GBS positive  NIPS low-risk, XX  Declined Tdap       Estimated Date of Delivery: Aug 17, 2024    EGA 39w6d    OB History    Para Term  AB Living   1 0 0 0 0 0   SAB IAB Ectopic Multiple Live Births   0 0 0 0 0      # Outcome Date GA Lbr Hipolito/2nd Weight Sex Type Anes PTL Lv   1 Current                 Lab Results   Component Value Date    AS Positive (A) 2024    HGB 12.8 2024       Prenatal Complications:   Elevated HCG at OB1 (225,000), declined amnio or CVS. NIPS low-risk and normal NT US  Anxiety- stopped sertraline at 32 wks, resume PP  Rh negative, given 23 d/t SEGUN, and 24 at 28 wks  Varicella non-immune  GBS positive    Exam:      BP (!) 112/93   Pulse 109   Temp 98.5  F (36.9  C) (Oral)   Resp 19   LMP 2023   SpO2 97%     Fetal heart Rate Category 1 with accelerations on admission  Contractions every 2-5 on admission    HEENT grossly normal  Neck: no lymphadenopathy or thryoidomegaly  Lungs respirations regular and unlabored  ABD gravid, non-tender  EXT:  moves freely  Vaginal exam 4.5 per RN  Membranes: intact    Assessment:   labor management   antibiotic therapy for GBS+    Plan: Admit - see IP orders  Pain medication as desired  Prophylactic antibiotic for + GBS status  Anticipate     Dr. Christian remains available for consultation and collaboration as needed.    Prenatal record reviewed.    CHARU Johnson CNM      2024   10:04 PM

## 2024-08-17 NOTE — ANESTHESIA POSTPROCEDURE EVALUATION
Patient: Natalya Cronin    Procedure: * No procedures listed *       Anesthesia Type:  Epidural    Note:     Postop Pain Control: Uneventful            Sign Out: Well controlled pain   PONV: No   Neuro/Psych: Uneventful            Sign Out: Acceptable/Baseline neuro status   Airway/Respiratory: Uneventful            Sign Out: Acceptable/Baseline resp. status   CV/Hemodynamics: Uneventful            Sign Out: Acceptable CV status; No obvious hypovolemia; No obvious fluid overload   Other NRE: NONE   DID A NON-ROUTINE EVENT OCCUR?            Last vitals:  Vitals:    08/17/24 0138 08/17/24 0530 08/17/24 0826   BP: 115/64 109/66 125/79   Pulse:  88 93   Resp:  16 16   Temp:  36.8  C (98.2  F) 36.7  C (98  F)   SpO2:   98%       Electronically Signed By: SHANA VILLAFANA MD  August 17, 2024  12:31 PM

## 2024-08-17 NOTE — LACTATION NOTE
Assisted Natalya and Dutch into football hold. Natalya states feeding has been going well, but Dutch will suck for a bit, then fall off the breast. This RN discussed supporting infant head and shoulders and grounding infant. Natalya is able to feel the difference when Dutch has a deep latch vs shallow latch. Discussed watching videos to show obtaining a deep latch as well as hand expression. Showed Natalya c-cup vs ucup and reiterated multiple times to bring infant to the breast, not the breast to the infant.     Dutch had multiple latches, would have multiple sucks, then push herself off the breast. This RN assessed infant suck, she does pull her tongue back but also presses her tongue against this RN finger. This RN encouraged Natalya to continue BF attempts and if she has concerns for infant intake there are options for supplementation if she would like.

## 2024-08-17 NOTE — L&D DELIVERY NOTE
Name: Natalya Cronin  : 2000  MRN: [id number]    PRE DELIVERY DIAGNOSIS  24 year old  at 39w6d  Elevated HCG at OB1 (225,000), declined amnio or CVS. NIPS low-risk and normal NT US  Anxiety- stopped sertraline at 32 wks, may resume PP  Rh negative, given 23 d/t SEGUN, and 24 at 28 wks  Varicella non-immune  GBS positive    POST DELIVERY DIAGNOSIS  1) 24 year old  39w6d  2) Delivery of a viable female infant weighing *pending* via  with APGARs of 8 and 9    YOB: 2026     Birth Time: 11:14 PM     Augmentation No              Indication: n/a  Induction No                      Indication: n/a    Monitors: External     GBS: Positive- adequate treatment, 2 doses PCN    ROM: SROM  Fluid Type: Clear    Labor Analgesia/Anesthesia:Epidural    Cord pH obtained: No   Placenta Date/Time: 2024 11:23 PM   Placenta submitted to Pathology: No    Description of procedure:   24 year old  with PNC w/ MWC and pregnancy complicated by *see above* presented to L&D with labor contractions.  She progressed to complete with SROM. Her hospital course was uncomplicated. Her cervix was 4.5cm  on admission at 1230. She received an epidural at 1830, after which she was 7cm.  SROM occurred at , fluid noted to be clear, cervix 9.5 cm. She was completely dilated at 2235.  Natalya pushed with effective efforts over the course of only 35 min and brought the baby to a slow controlled crown. The fetal head delivered in RC position followed by the anterior shoulder, which delivered easily with gentle downward traction paired with maternal efforts. Natalya Cronin and her  William welcomed their daughter, Dutch.     Shoulder Dystocia No  Operative Vaginal Delivery No    Infant spontaneously delivered over an intact perineum and right labial tear.  It was repaired in the normal fashion using epidural anesthesia using 4-0 vicryl.  Infant delivered in RC position.  Nuchal cord Yes     Delivered through    After a 5 min delay per parent's request, the umbilical cord was clamped x 2 and cut by FOB.  Placenta spontaneously delivered: 8/16/2024 11:23 PM  grossly intact with 3 vessel cord.    Infant:  Live, vigorous infant was handed to mom.    Delivery was complicated by nothing Interventions included fundal massage and pitocin.    Delivery QBL (mL): 125    Mother and Infant stable.    Dr. Christian remained available for consultation and collaboration as needed.      CHARU Singh, LUZ      8/16/2024 11:58 PM

## 2024-08-17 NOTE — PROGRESS NOTES
Pt recovered from a . . Fundas is firm 1 below umbilicus. Pt has been up walking and voiding. FOB is at bedside. Mom is breast feeding   is at bedside. VSS.    Arlin ramirez RN

## 2024-08-17 NOTE — PLAN OF CARE
Problem: Postpartum (Vaginal Delivery)  Goal: Anesthesia/Sedation Recovery  Outcome: Met     Problem: Postpartum (Vaginal Delivery)  Goal: Optimal Pain Control and Function  Outcome: Progressing  Intervention: Prevent or Manage Pain  Flowsheets  Taken 2024 1158  Pain Management Interventions:   medication (see MAR)   rest   quiet environment facilitated  Taken 2024 0895  Pain Management Interventions:   rest   medication offered but refused     Patient bonding with  and participating in  cares. Pain managed with medication (see MAR). Breastfeeding her infant, voiding and ambulating independently.

## 2024-08-17 NOTE — PROGRESS NOTES
Vaginal Delivery Postpartum Day 1    Patient Name:  Natalya Cronin  :      2000  MRN:      [id number]      Assessment:  Normal postpartum course.    Plan:  Continue current care.      Subjective:  The patient feels well:  Voiding without difficulty, lochia normal, tolerating normal diet, ambulating without difficulty and passing flatus. Voiding independently without complication. Pain is well controlled with current medications.  The patient has no concerns.  The baby is well and being fed by breast.      YOB: 2026   Birth Time: 11:14 PM     Prenatal Complications include:   Elevated HCG at OB1 (225,000), declined amnio or CVS. NIPS low-risk and normal NT US  Anxiety- stopped sertraline at 32 wks, resume PP  Rh negative, given 23 d/t SEGUN, and 24 at 28 wks  Varicella non-immune  GBS positive    Objective:  /79 (BP Location: Right arm, Patient Position: Semi-Dash's, Cuff Size: Adult Regular)   Pulse 93   Temp 98  F (36.7  C) (Oral)   Resp 16   LMP 2023   SpO2 98%   Breastfeeding Unknown   Patient Vitals for the past 24 hrs:   BP Temp Temp src Pulse Resp SpO2   24 0826 125/79 98  F (36.7  C) Oral 93 16 98 %   24 0530 109/66 98.2  F (36.8  C) Oral 88 16 --   24 0138 115/64 -- -- -- -- --   24 0123 122/64 -- -- -- -- --   24 0109 128/71 -- -- -- -- --   24 0053 125/76 -- -- -- -- --   24 0038 119/71 -- -- -- -- --   24 0023 117/64 -- -- -- -- --   24 0008 112/65 99.5  F (37.5  C) Oral -- 18 --   24 -- -- -- -- -- 100 %   24 125/72 -- -- -- -- --   24 -- -- -- -- -- 99 %   24 -- -- -- -- -- 100 %   24 -- -- -- -- -- 99 %   24 122/66 -- -- -- -- --   24 -- -- -- -- -- 98 %   24 -- -- -- -- -- 99 %   24 -- -- -- -- -- 99 %   24 131/77 -- -- -- -- --   24 -- -- -- -- -- 100 %   24 -- --  -- -- -- 99 %   08/16/24 2314 -- -- -- -- -- 90 %   08/16/24 2312 -- -- -- -- -- 100 %   08/16/24 2307 -- -- -- -- -- 98 %   08/16/24 2302 -- -- -- -- -- 99 %   08/16/24 2300 135/75 -- -- -- -- --   08/16/24 2257 -- -- -- -- -- 100 %   08/16/24 2252 -- -- -- -- -- 99 %   08/16/24 2247 -- -- -- -- -- 100 %   08/16/24 2245 134/77 -- -- -- -- --   08/16/24 2242 -- -- -- -- -- 100 %   08/16/24 2237 -- -- -- -- -- 98 %   08/16/24 2232 -- -- -- -- -- 99 %   08/16/24 2230 108/61 -- -- -- -- --   08/16/24 2227 -- -- -- -- -- 98 %   08/16/24 2222 -- -- -- -- -- 97 %   08/16/24 2217 -- -- -- -- -- 97 %   08/16/24 2215 101/59 -- -- -- -- --   08/16/24 2214 -- 98.5  F (36.9  C) Oral -- -- --   08/16/24 2212 -- -- -- -- -- 97 %   08/16/24 2207 -- -- -- -- -- 97 %   08/16/24 2202 -- -- -- -- -- 97 %   08/16/24 2200 107/61 100.4  F (38  C) Axillary -- 19 --   08/16/24 2157 -- -- -- -- -- 97 %   08/16/24 2152 -- -- -- -- -- 97 %   08/16/24 2147 -- -- -- -- -- 96 %   08/16/24 2145 (!) 144/81 -- -- -- -- --   08/16/24 2142 -- -- -- -- -- 96 %   08/16/24 2137 -- -- -- -- -- 96 %   08/16/24 2132 -- -- -- -- -- 96 %   08/16/24 2130 127/78 -- -- -- -- --   08/16/24 2127 -- -- -- -- -- 96 %   08/16/24 2122 -- -- -- -- -- 96 %   08/16/24 2117 -- -- -- -- -- 96 %   08/16/24 2113 (!) 112/93 98.5  F (36.9  C) Oral -- 19 97 %   08/16/24 2107 -- -- -- -- -- 97 %   08/16/24 2100 109/59 -- -- -- -- 96 %   08/16/24 2057 -- -- -- -- -- 97 %   08/16/24 2052 -- -- -- -- -- 97 %   08/16/24 2047 -- -- -- -- -- 97 %   08/16/24 2045 101/58 -- -- -- -- --   08/16/24 2042 -- -- -- -- -- 96 %   08/16/24 2037 -- -- -- -- -- 97 %   08/16/24 2032 -- -- -- -- -- 97 %   08/16/24 2030 102/60 -- -- -- -- --   08/16/24 2027 -- -- -- -- -- 96 %   08/16/24 2022 -- -- -- -- -- 97 %   08/16/24 2017 -- -- -- -- -- 97 %   08/16/24 2015 105/65 -- -- -- -- --   08/16/24 2012 -- -- -- -- -- 97 %   08/16/24 2007 -- -- -- -- -- 96 %   08/16/24 2000 105/60 -- -- -- -- 97 %    08/16/24 1934 116/68 -- -- -- -- --   08/16/24 1932 -- -- -- -- -- 97 %   08/16/24 1929 118/67 -- -- -- -- --   08/16/24 1927 -- -- -- -- -- 97 %   08/16/24 1924 119/69 -- -- -- -- --   08/16/24 1922 -- -- -- -- -- 97 %   08/16/24 1919 112/66 -- -- -- -- --   08/16/24 1917 -- -- -- -- -- 97 %   08/16/24 1915 111/67 -- -- -- -- --   08/16/24 1912 -- -- -- -- -- 97 %   08/16/24 1907 -- -- -- -- -- 97 %   08/16/24 1904 125/85 -- -- -- -- --   08/16/24 1902 -- -- -- -- -- 98 %   08/16/24 1859 123/83 -- -- -- -- --   08/16/24 1857 -- -- -- -- -- 98 %   08/16/24 1854 130/83 -- -- -- -- --   08/16/24 1852 -- -- -- -- -- 99 %   08/16/24 1849 129/83 -- -- -- -- --   08/16/24 1847 -- -- -- -- -- 100 %   08/16/24 1844 126/84 -- -- -- -- --   08/16/24 1842 -- -- -- -- -- 100 %   08/16/24 1838 127/84 -- -- -- -- --   08/16/24 1837 -- -- -- -- -- 100 %   08/16/24 1836 130/84 98.6  F (37  C) Oral 109 20 --   08/16/24 1834 133/85 -- -- -- -- --   08/16/24 1832 135/86 -- -- -- -- 100 %   08/16/24 1830 137/86 -- -- -- -- --   08/16/24 1827 -- -- -- -- -- 100 %   08/16/24 1822 -- -- -- -- -- 100 %   08/16/24 1817 -- -- -- -- -- 100 %   08/16/24 1812 -- -- -- -- -- 100 %   08/16/24 1545 130/83 -- -- 88 18 --       Exam: Patient A&O x 3. No acute distress, breathing unlabored.The amount and color of the lochia is appropriate for the duration of recovery. Uterine fundus is firm at U-1. Perineum:  not performed.        Lab Results   Component Value Date    AS Positive (A) 08/16/2024    HGB 11.0 (L) 08/17/2024         There is no immunization history on file for this patient.    Provider:  Olegario Chowdhury MD    Date:  8/17/2024  Time:  10:09 AM

## 2024-08-17 NOTE — PLAN OF CARE
Problem: Labor  Goal: Stable Fetal Wellbeing  Outcome: Progressing  Goal: Effective Progression to Delivery  Outcome: Progressing  Goal: Acceptable Pain Control  Outcome: Progressing  Goal: Normal Uterine Contraction Pattern  Outcome: Progressing   Goal Outcome Evaluation:                      Pt is resting in bed. Pt states she is feeling pressure with every contraction. Pt stated she was having some dizziness and just feeling off. Pt was repositioned and states she is feeling better. PT is 9.5 and feeling a ton of pressure with contractions.

## 2024-08-17 NOTE — LACTATION NOTE
"This note was copied from a baby's chart.  Rounded on family for lactation support per lactation consult request. Dutch is the first born for Modesta.  Natalya had success with prenatal colostrum expression.  Dutch delivered with 36 minutes of pushing.  Natalya and William shared that she is having trouble staying latched on the breast.    This LC assessed baby's tone and suck with gloved hands.  Muscle tension appreciated through tongue retraction, biting and shoulder raises.  Educated/demonstrated gentle massage to baby's back, shoulders, neck and jaws to ease muscle tension and facilitate a wider gape for latch.    Educated/reviewed hand expression using a C Hold and \"press, compress and release\".  Mom had great success with deep breast compression. Educated/reviewed importance of achieving an asymmetrical pain free latch with breastfeeding parent's nipple pointed toward baby's nose.  Assisted the dyad to achieve a deep asymmetrical latch in a football position with vertical maternal pillow support to allow for full arm and baby ROM.  Breast compression encouraged to optimize milk transfer. Dutch was sleepy and resistant to maintain a feeding.  Natalya hand expressed approx 2ml of colostrum and provided to Dutch via a spoon.    Provided education and a resource/teaching sheet with QR codes for video support/education for:  Hand expressing and storing breastmilk  Achieving a Deep Asymmetrical Latch  Breastfeeding Positions  How to Choose a breast pump flange size   Side Lying paced bottle feeding if supplementation is needed.    Questions encouraged and addressed.    Sydnee Sood RNC, IBCLC        "

## 2024-08-17 NOTE — PROVIDER NOTIFICATION
08/16/24 2118   Provider Notification   Provider Name/Title LUZ Soto   Method of Notification Phone   Request Evaluate in Person   Notification Reason Membrane Status;Labor Status;Status Update;SVE     CNM called to let provider know pt just has an anterior lip. Pt SROM clear fluid. Pt is starting to feel constant pressure.     Provider states she will be on her way.     Arlin Haney RN

## 2024-08-18 VITALS
HEART RATE: 93 BPM | OXYGEN SATURATION: 97 % | SYSTOLIC BLOOD PRESSURE: 124 MMHG | RESPIRATION RATE: 18 BRPM | TEMPERATURE: 98.1 F | DIASTOLIC BLOOD PRESSURE: 84 MMHG

## 2024-08-18 PROCEDURE — 250N000013 HC RX MED GY IP 250 OP 250 PS 637: Performed by: ADVANCED PRACTICE MIDWIFE

## 2024-08-18 RX ADMIN — DOCUSATE SODIUM 100 MG: 100 CAPSULE, LIQUID FILLED ORAL at 08:15

## 2024-08-18 ASSESSMENT — ACTIVITIES OF DAILY LIVING (ADL)
ADLS_ACUITY_SCORE: 18

## 2024-08-18 NOTE — DISCHARGE SUMMARY
OB Discharge Summary      Date:  2024    Name:  Natalya Cronin  :  2000  MRN:  [id number]      Admission Date:  2024  Delivery Date: 2026   Gestational Age at Delivery:  39w6d  Discharge Date:  2024    Principal Diagnosis:    Patient Active Problem List   Diagnosis    Encounter for triage in pregnant patient    Uterine contractions         Conditions complicating Pregnancy:   Elevated HCG at OB1 (225,000), declined amnio or CVS. NIPS low-risk and normal NT US  Anxiety- stopped sertraline at 32 wks, resume PP  Rh negative, given 23 d/t SEGUN, and 24 at 28 wks  Varicella non-immune  GBS positive    Procedure(s) Performed:  Vaginal Delivery    Indication for :  N/A  Indication for Induction:  N/A     Condition at Discharge:  Stable    Discharge Medications:      Review of your medicines        CONTINUE these medicines which have NOT CHANGED        Dose / Directions   prenatal multivitamin  plus iron 27-1 MG Tabs      Take by mouth daily  Refills: 0               Discharge Plan:    Follow up with /CNM:  6 weeks for postpartum  exam   Patient Instructions:      Physical activity: Advance as tolerated    Diet:  Regular Adult Diet    Medication: As above. May take OTC Tylenol, Ibuprofen, and Stool Softener as needed.     Other:  N/A      Physician/CNM: Olegario Chowdhury MD    Name:  Natalya Cronin  :  2000  MRN:  [id number]

## 2024-08-18 NOTE — PROGRESS NOTES
Vaginal Delivery Postpartum Day 2    Patient Name:  Natalya Cronin  :      2000  MRN:      [id number]      Assessment:  Normal postpartum course.    Plan:  Continue current care. Discharge to home. Follow up 6 weeks.     Subjective:  The patient feels well:  Voiding without difficulty, lochia normal, tolerating normal diet, ambulating without difficulty and passing flatus.  Voiding independently without complication. Pain is well controlled with current medications.  The patient has no emotional concerns.  The baby is well and being fed by both breast and bottle.    YOB: 2026   Birth Time: 11:14 PM     Prenatal complications:  Elevated HCG at OB1 (225,000), declined amnio or CVS. NIPS low-risk and normal NT US  Anxiety- stopped sertraline at 32 wks, resume PP  Rh negative, given 23 d/t SEGUN, and 24 at 28 wks  Varicella non-immune  GBS positive    Objective:  /78 (BP Location: Right arm, Patient Position: Semi-Dash's, Cuff Size: Adult Regular)   Pulse 80   Temp 98.6  F (37  C) (Oral)   Resp 18   LMP 2023   SpO2 97%   Breastfeeding Unknown     .  Patient Vitals for the past 24 hrs:   BP Temp Temp src Pulse Resp SpO2   24 2353 116/78 98.6  F (37  C) Oral 80 18 --   24 2100 120/85 98.3  F (36.8  C) Oral 82 18 --   24 1701 132/85 98.1  F (36.7  C) Oral 85 18 97 %   24 1237 118/78 -- -- 85 18 97 %       Exam: Patient A&O x 3. No acute distress, breathing unlabored.The amount and color of the lochia is appropriate for the duration of recovery. Uterine fundus is firm at U-1. Perineum:  not examined, see RN note.      Lab Results   Component Value Date    AS Positive (A) 2024    HGB 11.0 (L) 2024         There is no immunization history on file for this patient.    Provider:  Olegario Chowdhury MD    Date:  2024  Time:  8:28 AM

## 2024-08-18 NOTE — PLAN OF CARE
Goal: Absence of Hospital-Acquired Illness or Injury  Outcome: Progressing  Intervention: Prevent Skin Injury  Recent Flowsheet Documentation  Taken 8/17/2024 2353 by Diana Thomas RN  Body Position: position changed independently  Goal: Optimal Comfort and Wellbeing  Outcome: Progressing  Intervention: Provide Person-Centered Care  Recent Flowsheet Documentation  Taken 8/17/2024 2353 by Diana Thomas RN  Trust Relationship/Rapport:   care explained   choices provided   emotional support provided   empathic listening provided   questions answered   questions encouraged   reassurance provided   thoughts/feelings acknowledged  Taken 8/17/2024 2100 by Diana Thomas RN  Trust Relationship/Rapport:   care explained   choices provided   emotional support provided   empathic listening provided   questions answered   questions encouraged   reassurance provided   thoughts/feelings acknowledged  Goal: Readiness for Transition of Care  Outcome: Progressing     Problem: Postpartum (Vaginal Delivery)  Goal: Successful Parent Role Transition  Outcome: Progressing  Goal: Hemostasis  Outcome: Progressing  Goal: Absence of Infection Signs and Symptoms  Outcome: Progressing  Goal: Optimal Pain Control and Function  Outcome: Progressing  Goal: Effective Urinary Elimination  Outcome: Progressing   Goal Outcome Evaluation:           Baby breastfeeding on demand every 2-3 hours. Pain controlled with Ibuprofen and tyelnol. Up independently, voiding without difficulty. Postpartum assessment WNL, see flowsheets for more information. Significant other at bedside, supportive. Caregiver education and support on-going.    Diana Thomas, GUADALUPE

## 2024-08-18 NOTE — LACTATION NOTE
This note was copied from a baby's chart.  Rounded on mom and baby for lactation follow up.  Natalya and William reported continued practice with latch and breastfeeding. Dutch's 24 hour weight loss is 4% and she has been voiding and stooling well.  Natalya reported no maternal health concerns that would affect milk production. She continues to be able to collect colostrum to offer to baby as well.     Provided education and a resource/teaching sheet with QR codes for video support/education for:  Zomee pump use  Treating engorgment    Educated/reviewed milk production of supply and demand.  Encouraged mom to breastfeed on demand with a goal of 8 feedings per day to help milk production. Reviewed expectation of transitional milk arriving by 3-5 days of life and mature milk by 2 weeks of life.  Educated on importance of frequent breastfeeding or milk expression to establish a healthy milk supply.    Educated/reviewed signs of milk transfer with gentle tug at the breast, audible swallows and wet and soiled diapers per the education folder I & O.      Natalya will be at home full time with her infant. She is connected with her clinic (former employer), [facility] and their lactation support.    Questions encouraged and addressed.    Sydnee Sood RNC, IBCLC

## 2024-08-18 NOTE — PLAN OF CARE
"  Problem: Adult Inpatient Plan of Care  Goal: Plan of Care Review  Description: The Plan of Care Review/Shift note should be completed every shift.  The Outcome Evaluation is a brief statement about your assessment that the patient is improving, declining, or no change.  This information will be displayed automatically on your shift  note.  Outcome: Adequate for Care Transition  Goal: Patient-Specific Goal (Individualized)  Description: You can add care plan individualizations to a care plan. Examples of Individualization might be:  \"Parent requests to be called daily at 9am for status\", \"I have a hard time hearing out of my right ear\", or \"Do not touch me to wake me up as it startles  me\".  Outcome: Adequate for Care Transition  Goal: Absence of Hospital-Acquired Illness or Injury  Outcome: Adequate for Care Transition  Goal: Optimal Comfort and Wellbeing  Outcome: Adequate for Care Transition  Goal: Readiness for Transition of Care  Outcome: Adequate for Care Transition     Problem: Postpartum (Vaginal Delivery)  Goal: Successful Parent Role Transition  Outcome: Adequate for Care Transition  Goal: Hemostasis  Outcome: Adequate for Care Transition  Goal: Absence of Infection Signs and Symptoms  Outcome: Adequate for Care Transition  Goal: Optimal Pain Control and Function  Outcome: Adequate for Care Transition  Goal: Effective Urinary Elimination  Outcome: Adequate for Care Transition     "

## 2024-08-18 NOTE — DISCHARGE INSTRUCTIONS
Warning Signs after Having a Baby    Keep this paper on your fridge or somewhere else where you can see it.    Call your provider if you have any of these symptoms up to 12 weeks after having your baby.    Thoughts of hurting yourself or your baby  Pain in your chest or trouble breathing  Severe headache not helped by pain medicine  Eyesight concerns (blurry vision, seeing spots or flashes of light, other changes to eyesight)  Fainting, shaking or other signs of a seizure    Call 9-1-1 if you feel that it is an emergency.     The symptoms below can happen to anyone after giving birth. They can be very serious. Call your provider if you have any of these warning signs.    My provider s phone number: _______________________    Losing too much blood (hemorrhage)    Call your provider if you soak through a pad in less than an hour or pass blood clots bigger than a golf ball. These may be signs that you are bleeding too much.    Blood clots in the legs or lungs    After you give birth, your body naturally clots its blood to help prevent blood loss. Sometimes this increased clotting can happen in other areas of the body, like the legs or lungs. This can block your blood flow and be very dangerous.     Call your provider if you:  Have a red, swollen spot on the back of your leg that is warm or painful when you touch it.   Are coughing up blood.     Infection    Call your provider if you have any of these symptoms:  Fever of 100.4 F (38 C) or higher.  Pain or redness around your stitches if you had an incision.   Any yellow, white, or green fluid coming from places where you had stitches or surgery.    Mood Problems (postpartum depression)    Many people feel sad or have mood changes after having a baby. But for some people, these mood swings are worse.     Call your provider right away if you feel so anxious or nervous that you can't care for yourself or your baby.    Preeclampsia (high blood pressure)    Even if you  didn't have high blood pressure when you were pregnant, you are at risk for the high blood pressure disease called preeclampsia. This risk can last up to 12 weeks after giving birth.     Call your provider if you have:   Pain on your right side under your rib cage  Sudden swelling in the hands and face    Remember: You know your body. If something doesn't feel right, get medical help.     For informational purposes only. Not to replace the advice of your health care provider. Copyright 2020 [facility]. All rights reserved. Clinically reviewed by Lucy Hood, RNC-OB, MSN. [employer] 208638 - Rev 02/23.